# Patient Record
Sex: MALE | Race: WHITE | NOT HISPANIC OR LATINO
[De-identification: names, ages, dates, MRNs, and addresses within clinical notes are randomized per-mention and may not be internally consistent; named-entity substitution may affect disease eponyms.]

---

## 2017-01-27 ENCOUNTER — TRANSCRIPTION ENCOUNTER (OUTPATIENT)
Age: 57
End: 2017-01-27

## 2020-04-27 ENCOUNTER — OUTPATIENT (OUTPATIENT)
Dept: OUTPATIENT SERVICES | Facility: HOSPITAL | Age: 60
LOS: 1 days | Discharge: HOME | End: 2020-04-27

## 2020-04-30 ENCOUNTER — TRANSCRIPTION ENCOUNTER (OUTPATIENT)
Age: 60
End: 2020-04-30

## 2020-05-01 ENCOUNTER — TRANSCRIPTION ENCOUNTER (OUTPATIENT)
Age: 60
End: 2020-05-01

## 2020-05-28 DIAGNOSIS — F42.2 MIXED OBSESSIONAL THOUGHTS AND ACTS: ICD-10-CM

## 2021-11-11 ENCOUNTER — APPOINTMENT (OUTPATIENT)
Dept: OTOLARYNGOLOGY | Facility: CLINIC | Age: 61
End: 2021-11-11
Payer: COMMERCIAL

## 2021-11-11 VITALS
DIASTOLIC BLOOD PRESSURE: 83 MMHG | HEART RATE: 57 BPM | HEIGHT: 69 IN | SYSTOLIC BLOOD PRESSURE: 127 MMHG | BODY MASS INDEX: 26.66 KG/M2 | WEIGHT: 180 LBS

## 2021-11-11 DIAGNOSIS — Z86.2 PERSONAL HISTORY OF DISEASES OF THE BLOOD AND BLOOD-FORMING ORGANS AND CERTAIN DISORDERS INVOLVING THE IMMUNE MECHANISM: ICD-10-CM

## 2021-11-11 DIAGNOSIS — F42.9 OBSESSIVE-COMPULSIVE DISORDER, UNSPECIFIED: ICD-10-CM

## 2021-11-11 DIAGNOSIS — Z78.9 OTHER SPECIFIED HEALTH STATUS: ICD-10-CM

## 2021-11-11 DIAGNOSIS — Z86.19 PERSONAL HISTORY OF OTHER INFECTIOUS AND PARASITIC DISEASES: ICD-10-CM

## 2021-11-11 DIAGNOSIS — H93.8X3 OTHER SPECIFIED DISORDERS OF EAR, BILATERAL: ICD-10-CM

## 2021-11-11 DIAGNOSIS — E04.1 NONTOXIC SINGLE THYROID NODULE: ICD-10-CM

## 2021-11-11 PROCEDURE — 99203 OFFICE O/P NEW LOW 30 MIN: CPT

## 2021-11-11 PROCEDURE — 92550 TYMPANOMETRY & REFLEX THRESH: CPT

## 2021-11-11 PROCEDURE — 92557 COMPREHENSIVE HEARING TEST: CPT

## 2021-11-11 PROCEDURE — 92588 EVOKED AUDITORY TST COMPLETE: CPT

## 2021-11-11 RX ORDER — PREDNISONE 10 MG/1
10 TABLET ORAL 3 TIMES DAILY
Qty: 39 | Refills: 0 | Status: ACTIVE | COMMUNITY
Start: 2021-11-11 | End: 1900-01-01

## 2021-11-11 RX ORDER — NORTRIPTYLINE HYDROCHLORIDE 75 MG/1
CAPSULE ORAL
Refills: 0 | Status: ACTIVE | COMMUNITY

## 2021-11-11 RX ORDER — FLUOXETINE HCL 10 MG
TABLET ORAL
Refills: 0 | Status: ACTIVE | COMMUNITY
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
## 2021-11-11 NOTE — REASON FOR VISIT
[Initial Consultation] : an initial consultation for [FreeTextEntry2] : Patient here to check on ears

## 2021-11-11 NOTE — DATA REVIEWED
[de-identified] : AS: Type As tymp )ETF abnormal )AD: Type A tymp (ETF abnormal)\par OAEs: AD; present 1-3.4kHz; AS; present 1-2.4kHz\par AUDIO: AD: Mild SNHL rising to WNL precipitously sloping to a moderately severe SNHL 250-8000 Hz - AS: Hearing -2000 Hz precipitously sloping to a severe SNHL 5440-2590 Hz\par REC: 1) ENT F/U 2)otoneuro w/u

## 2021-11-11 NOTE — ASSESSMENT
[FreeTextEntry1] : reviewed and reconciled meds, allergies, Pmhx, Famhx, Sochx, Surghx\par \par H/o Guillan barre and OCD presents wit B/L Tinnitus and clogged ears\par Thyroid nodule on PE\par \par \par \par \par \par Plan\par Audio reviewed and interpreted by Dr. Novak bilateral profound snhl. right with low freq loss as well.\par Sono thyroid to be done at [facility]\par \par Steroids for 12 days calendar given

## 2021-11-11 NOTE — REVIEW OF SYSTEMS
[Hearing Loss] : hearing loss [Ear Noises] : ear noises [Lightheadedness] : lightheadedness [Eyes Itch] : itching of the eyes [Joint Pain] : joint pain [Negative] : Heme/Lymph [FreeTextEntry9] : muscle aches [FreeTextEntry1] : Feel cooler than others

## 2021-11-11 NOTE — PHYSICAL EXAM
[Hearing Valdez Test (Tuning Fork On Forehead)] : no lateralization of tone [] : septum deviated to the left [Normal] : mucosa is normal [Midline] : trachea located in midline position [de-identified] : thyroid nodule [FreeTextEntry1] : no sinus tenderness,sensation intact [de-identified] : pupils equal and reactive

## 2021-11-24 ENCOUNTER — APPOINTMENT (OUTPATIENT)
Dept: OTOLARYNGOLOGY | Facility: CLINIC | Age: 61
End: 2021-11-24
Payer: COMMERCIAL

## 2021-11-24 VITALS
BODY MASS INDEX: 26.36 KG/M2 | HEIGHT: 69 IN | SYSTOLIC BLOOD PRESSURE: 129 MMHG | HEART RATE: 69 BPM | DIASTOLIC BLOOD PRESSURE: 84 MMHG | WEIGHT: 178 LBS

## 2021-11-24 DIAGNOSIS — H93.13 TINNITUS, BILATERAL: ICD-10-CM

## 2021-11-24 DIAGNOSIS — H90.5 UNSPECIFIED SENSORINEURAL HEARING LOSS: ICD-10-CM

## 2021-11-24 DIAGNOSIS — G61.0 GUILLAIN-BARRE SYNDROME: ICD-10-CM

## 2021-11-24 DIAGNOSIS — H83.3X1 NOISE EFFECTS ON RIGHT INNER EAR: ICD-10-CM

## 2021-11-24 PROCEDURE — 92588 EVOKED AUDITORY TST COMPLETE: CPT

## 2021-11-24 PROCEDURE — 92557 COMPREHENSIVE HEARING TEST: CPT

## 2021-11-24 PROCEDURE — 99213 OFFICE O/P EST LOW 20 MIN: CPT

## 2021-11-24 PROCEDURE — 92550 TYMPANOMETRY & REFLEX THRESH: CPT

## 2021-11-24 NOTE — PHYSICAL EXAM
[Hearing Valdez Test (Tuning Fork On Forehead)] : no lateralization of tone [Normal] : no abnormal secretions [Hearing Loss Right Only] : normal [Hearing Loss Left Only] : normal [de-identified] : sensations intact
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
## 2021-11-24 NOTE — DATA REVIEWED
[de-identified] : AD: Type A\par AS: Type As\par AUDIO:AD: mild SNHL, 250-500 Hx, WNL, 750-3K Hz, mod-severe SNHL, 4-8KHz\par AS: WNL, 250-2KHz, mod-severe to severe SNHL, 3-8KHz\par REC: ENT f/u, re-eval per MD, further recs pending\par

## 2022-10-23 ENCOUNTER — NON-APPOINTMENT (OUTPATIENT)
Age: 62
End: 2022-10-23

## 2022-11-01 ENCOUNTER — APPOINTMENT (OUTPATIENT)
Dept: NUCLEAR MEDICINE | Facility: CLINIC | Age: 62
End: 2022-11-01

## 2024-02-04 ENCOUNTER — NON-APPOINTMENT (OUTPATIENT)
Age: 64
End: 2024-02-04

## 2024-10-01 ENCOUNTER — NON-APPOINTMENT (OUTPATIENT)
Age: 64
End: 2024-10-01

## 2024-10-02 ENCOUNTER — NON-APPOINTMENT (OUTPATIENT)
Age: 64
End: 2024-10-02

## 2024-10-02 ENCOUNTER — APPOINTMENT (OUTPATIENT)
Dept: OTOLARYNGOLOGY | Facility: CLINIC | Age: 64
End: 2024-10-02
Payer: MEDICARE

## 2024-10-02 VITALS
BODY MASS INDEX: 28.6 KG/M2 | WEIGHT: 193.13 LBS | HEART RATE: 70 BPM | HEIGHT: 69 IN | SYSTOLIC BLOOD PRESSURE: 100 MMHG | DIASTOLIC BLOOD PRESSURE: 69 MMHG

## 2024-10-02 DIAGNOSIS — K21.9 GASTRO-ESOPHAGEAL REFLUX DISEASE W/OUT ESOPHAGITIS: ICD-10-CM

## 2024-10-02 DIAGNOSIS — J34.2 DEVIATED NASAL SEPTUM: ICD-10-CM

## 2024-10-02 DIAGNOSIS — R07.0 PAIN IN THROAT: ICD-10-CM

## 2024-10-02 DIAGNOSIS — R22.1 LOCALIZED SWELLING, MASS AND LUMP, HEAD: ICD-10-CM

## 2024-10-02 DIAGNOSIS — J38.4 EDEMA OF LARYNX: ICD-10-CM

## 2024-10-02 DIAGNOSIS — G61.0 GUILLAIN-BARRE SYNDROME: ICD-10-CM

## 2024-10-02 DIAGNOSIS — R22.0 LOCALIZED SWELLING, MASS AND LUMP, HEAD: ICD-10-CM

## 2024-10-02 DIAGNOSIS — Z82.49 FAMILY HISTORY OF ISCHEMIC HEART DISEASE AND OTHER DISEASES OF THE CIRCULATORY SYSTEM: ICD-10-CM

## 2024-10-02 DIAGNOSIS — H93.13 TINNITUS, BILATERAL: ICD-10-CM

## 2024-10-02 DIAGNOSIS — H90.3 SENSORINEURAL HEARING LOSS, BILATERAL: ICD-10-CM

## 2024-10-02 PROCEDURE — 31575 DIAGNOSTIC LARYNGOSCOPY: CPT

## 2024-10-02 PROCEDURE — 99203 OFFICE O/P NEW LOW 30 MIN: CPT | Mod: 25

## 2024-10-02 PROCEDURE — 92557 COMPREHENSIVE HEARING TEST: CPT

## 2024-10-02 PROCEDURE — 92570 ACOUSTIC IMMITANCE TESTING: CPT

## 2024-10-02 NOTE — HISTORY OF PRESENT ILLNESS
[de-identified] : Patient with h/o asymmetrical bilateral HF HL presents today stating that he gets a sore throat on the right side of his throat every couple of months.  He states that the last 2 times this sore throat happened it last about a month. He also states that he has bilateral tinnitus, which started about 12 years ago when he was diagnosed with Guillain-[place] Syndrome. He denies improvement from Arches Tinnitus Formula and Lipoflavonoids. He denies smoking. He denies drinking alcohol. He states that his sore throat pain can extend up into his ear.

## 2024-10-02 NOTE — ASSESSMENT
[FreeTextEntry1] : Reviewed and reconciled medications, allergies, PMHx, PSHx, SocHx, FMHx.  Patient with h/o asymmetrical bilateral HF HL presents today stating that he gets a sore throat on the right side of his throat every couple of months.  He states that the last 2 times this sore throat happened it last about a month. He also states that he has bilateral tinnitus, which started about 12 years ago when he was diagnosed with Guillain-[place] Syndrome. He denies improvement from Arches Tinnitus Formula and Lipoflavonoids. He denies smoking. He denies drinking alcohol. He states that his sore throat pain can extend up into his ear.   Thyroid 11/2021: -normal -no evidence of cervical lymph nodes   Physical exam: -ears are clean and clear bilaterally -tuning forks lateralizes to the left ear -deviated septum left along the floor -mildly inflamed turbinates bilaterally -tonsils removed -fullness left neck at the level of the carotid -pupils are equal and reactive  ENT Procedure: Flexible laryngoscopy -deviated septum left -mildly inflamed nasopharynx L>R -BOT appears normal -bilateral lingual tonsil hyperplasia, but symmetric -edema of the postcricoid, arytenoids and interarytenoids.  Audio 10/2/24: -Type A Tymps AU -ETF WNL AD -ETF Abnormal AS -AD: Hearing WNL sloping to a mod-severe SNHL 250-8000 Hz -AS: Hearing WNL sloping to severe SNHL 250-8000 Hz -88% discrimination at 60 dB AD -92% discrimination at 65 dB AS -right TPP: +14 -left TPP: 0  Plan:  Audio - results interpreted by Dr. Novak and reviewed with the patient. -Consider amplification -Start Reflux diet- no eating 2 hours before bed -Ordered CT Neck Soft Tissue -FU with results from CT scan

## 2024-10-02 NOTE — PHYSICAL EXAM
[Valdez Test Lateralizes To Left] : tone lateralization to the left [] : septum deviated to the left [Midline] : trachea located in midline position [Removed] : palatine tonsils previously removed [Normal] : no rashes [de-identified] : fullness left neck at the level of the carotid [Hearing Loss Right Only] : normal [Hearing Loss Left Only] : normal [de-identified] :  mildly inflamed turbinates bilaterally [FreeTextEntry2] :  sinuses nontender to percussion.  sensations intact [de-identified] : pupils are equal and reactive

## 2024-10-02 NOTE — HISTORY OF PRESENT ILLNESS
[de-identified] : Patient with h/o asymmetrical bilateral HF HL presents today stating that he gets a sore throat on the right side of his throat every couple of months.  He states that the last 2 times this sore throat happened it last about a month. He also states that he has bilateral tinnitus, which started about 12 years ago when he was diagnosed with Guillain-[place] Syndrome. He denies improvement from Arches Tinnitus Formula and Lipoflavonoids. He denies smoking. He denies drinking alcohol. He states that his sore throat pain can extend up into his ear.

## 2024-10-02 NOTE — PROCEDURE
[Complicated Symptoms] : complicated symptoms requiring more thorough examination than provided by mirror [de-identified] :  Procedure: Flexible Fiberoptic Laryngoscopy: Risks, benefits, and alternatives of flexible endoscopy were explained to the patient. The patient gave oral consent to proceed. The flexible scope was inserted into the right nasal cavity and advanced towards the nasopharynx. Visualized mucosa over the turbinates and septum were as described above. There was a deviated septum left. The nasopharynx was mildly inflamed L>R. Oropharyngeal walls were symmetric and mobile without lesion, mass, or edema. Hypopharynx was also without lesion or edema. Larynx was mobile without lesions. Supraglottic structures were free of mass and asymmetry, but edema of the postcricoid, arytenoids and interarytenoids. True vocal folds were white without mass or lesion. Base of tongue was within normal limits. There was bilateral lingual tonsil hyperplasia, but symmetric. -deviated septum left -mildly inflamed nasopharynx L>R -BOT appears normal -bilateral lingual tonsil hyperplasia, but symmetric -edema of the postcricoid, arytenoids and interarytenoids.

## 2024-10-02 NOTE — ADDENDUM
[FreeTextEntry1] :  Documented by Myron Sung acting as scribe for Dr. Novak on 10/02/2024. All Medical record entries made by the Scribe were at my, Dr. Novak, direction and personally dictated by me on 10/02/2024 . I have reviewed the chart and agree that the record accurately reflects my personal performance of the history, physical exam, assessment and plan. I have also personally directed, reviewed, and agreed with the discharge instructions.

## 2024-10-02 NOTE — ASSESSMENT
[FreeTextEntry1] : Reviewed and reconciled medications, allergies, PMHx, PSHx, SocHx, FMHx.  Patient with h/o asymmetrical bilateral HF HL presents today stating that he gets a sore throat on the right side of his throat every couple of months.  He states that the last 2 times this sore throat happened it last about a month. He also states that he has bilateral tinnitus, which started about 12 years ago when he was diagnosed with Guillain-[place] Syndrome. He denies improvement from Arches Tinnitus Formula and Lipoflavonoids. He denies smoking. He denies drinking alcohol. He states that his sore throat pain can extend up into his ear.   Thyroid 11/2021: -normal -no evidence of cervical lymph nodes   Physical exam: -ears are clean and clear bilaterally -tuning forks lateralizes to the left ear -deviated septum left along the floor -mildly inflamed turbinates bilaterally -tonsils removed -fullness left neck at the level of the carotid -pupils are equal and reactive  ENT Procedure: Flexible laryngoscopy -deviated septum left -mildly inflamed nasopharynx L>R -BOT appears normal -bilateral lingual tonsil hyperplasia, but symmetric -edema of the postcricoid, arytenoids and interarytenoids.  Audio 10/2/24: -Type A Tymps AU -ETF WNL AD -ETF Abnormal AS -AD: Hearing WNL sloping to a mod-severe SNHL 250-8000 Hz -AS: Hearing WNL sloping to severe SNHL 250-8000 Hz -88% discrimination at 60 dB AD -92% discrimination at 65 dB AS -right TPP: +14 -left TPP: 0  Plan:  Audio - results interpreted by Dr. Novak and reviewed with the patient. -Consider amplification -Start Reflux diet- no eating 2 hours before bed -Ordered CT Neck Soft Tissue -FU with results from CT scan

## 2024-10-02 NOTE — CONSULT LETTER
[Dear  ___] : Dear  [unfilled], [Consult Letter:] : I had the pleasure of evaluating your patient, [unfilled]. [Please see my note below.] : Please see my note below. [Consult Closing:] : Thank you very much for allowing me to participate in the care of this patient.  If you have any questions, please do not hesitate to contact me. [Sincerely,] : Sincerely, [FreeTextEntry3] :  Max Novak MD FACS

## 2024-10-02 NOTE — PROCEDURE
[Complicated Symptoms] : complicated symptoms requiring more thorough examination than provided by mirror [de-identified] :  Procedure: Flexible Fiberoptic Laryngoscopy: Risks, benefits, and alternatives of flexible endoscopy were explained to the patient. The patient gave oral consent to proceed. The flexible scope was inserted into the right nasal cavity and advanced towards the nasopharynx. Visualized mucosa over the turbinates and septum were as described above. There was a deviated septum left. The nasopharynx was mildly inflamed L>R. Oropharyngeal walls were symmetric and mobile without lesion, mass, or edema. Hypopharynx was also without lesion or edema. Larynx was mobile without lesions. Supraglottic structures were free of mass and asymmetry, but edema of the postcricoid, arytenoids and interarytenoids. True vocal folds were white without mass or lesion. Base of tongue was within normal limits. There was bilateral lingual tonsil hyperplasia, but symmetric. -deviated septum left -mildly inflamed nasopharynx L>R -BOT appears normal -bilateral lingual tonsil hyperplasia, but symmetric -edema of the postcricoid, arytenoids and interarytenoids.

## 2024-10-02 NOTE — REASON FOR VISIT
[Initial Consultation] : an initial consultation for [FreeTextEntry2] : hissing in ear and burning in throat

## 2024-10-02 NOTE — DATA REVIEWED
[de-identified] : Audio 10/2/24: -Type A Tymps AU -ETF WNL AD -ETF Abnormal AS -AD: Hearing WNL sloping to a mod-severe SNHL 250-8000 Hz -AS: Hearing WNL sloping to severe SNHL 250-8000 Hz -88% discrimination at 60 dB AD -92% discrimination at 65 dB AS -right TPP: +14 -left TPP: 0 [de-identified] : US Thyroid 11/2021: -normal -no evidence of cervical lymph nodes

## 2024-10-02 NOTE — PHYSICAL EXAM
[Valdez Test Lateralizes To Left] : tone lateralization to the left [] : septum deviated to the left [Midline] : trachea located in midline position [Removed] : palatine tonsils previously removed [Normal] : no rashes [de-identified] : fullness left neck at the level of the carotid [Hearing Loss Right Only] : normal [Hearing Loss Left Only] : normal [de-identified] :  mildly inflamed turbinates bilaterally [FreeTextEntry2] :  sinuses nontender to percussion.  sensations intact [de-identified] : pupils are equal and reactive

## 2024-10-02 NOTE — DATA REVIEWED
[de-identified] : Audio 10/2/24: -Type A Tymps AU -ETF WNL AD -ETF Abnormal AS -AD: Hearing WNL sloping to a mod-severe SNHL 250-8000 Hz -AS: Hearing WNL sloping to severe SNHL 250-8000 Hz -88% discrimination at 60 dB AD -92% discrimination at 65 dB AS -right TPP: +14 -left TPP: 0 [de-identified] : US Thyroid 11/2021: -normal -no evidence of cervical lymph nodes

## 2025-07-28 ENCOUNTER — NON-APPOINTMENT (OUTPATIENT)
Age: 65
End: 2025-07-28

## 2025-07-28 ENCOUNTER — EMERGENCY (EMERGENCY)
Facility: HOSPITAL | Age: 65
LOS: 1 days | End: 2025-07-28
Attending: EMERGENCY MEDICINE
Payer: MEDICARE

## 2025-07-28 VITALS
HEART RATE: 59 BPM | DIASTOLIC BLOOD PRESSURE: 64 MMHG | TEMPERATURE: 98 F | RESPIRATION RATE: 18 BRPM | OXYGEN SATURATION: 98 % | SYSTOLIC BLOOD PRESSURE: 145 MMHG | WEIGHT: 199.08 LBS

## 2025-07-28 LAB
ALBUMIN SERPL ELPH-MCNC: 4.2 G/DL — SIGNIFICANT CHANGE UP (ref 3.3–5.2)
ALP SERPL-CCNC: 82 U/L — SIGNIFICANT CHANGE UP (ref 40–120)
ALT FLD-CCNC: 17 U/L — SIGNIFICANT CHANGE UP
ANION GAP SERPL CALC-SCNC: 12 MMOL/L — SIGNIFICANT CHANGE UP (ref 5–17)
APTT BLD: 29.6 SEC — SIGNIFICANT CHANGE UP (ref 26.1–36.8)
AST SERPL-CCNC: 19 U/L — SIGNIFICANT CHANGE UP
BASOPHILS # BLD AUTO: 0.03 K/UL — SIGNIFICANT CHANGE UP (ref 0–0.2)
BASOPHILS NFR BLD AUTO: 0.5 % — SIGNIFICANT CHANGE UP (ref 0–2)
BILIRUB SERPL-MCNC: 0.6 MG/DL — SIGNIFICANT CHANGE UP (ref 0.4–2)
BUN SERPL-MCNC: 17.1 MG/DL — SIGNIFICANT CHANGE UP (ref 8–20)
CALCIUM SERPL-MCNC: 9.4 MG/DL — SIGNIFICANT CHANGE UP (ref 8.4–10.5)
CHLORIDE SERPL-SCNC: 101 MMOL/L — SIGNIFICANT CHANGE UP (ref 96–108)
CO2 SERPL-SCNC: 25 MMOL/L — SIGNIFICANT CHANGE UP (ref 22–29)
CREAT SERPL-MCNC: 0.86 MG/DL — SIGNIFICANT CHANGE UP (ref 0.5–1.3)
EGFR: 96 ML/MIN/1.73M2 — SIGNIFICANT CHANGE UP
EGFR: 96 ML/MIN/1.73M2 — SIGNIFICANT CHANGE UP
EOSINOPHIL # BLD AUTO: 0.1 K/UL — SIGNIFICANT CHANGE UP (ref 0–0.5)
EOSINOPHIL NFR BLD AUTO: 1.8 % — SIGNIFICANT CHANGE UP (ref 0–6)
GLUCOSE SERPL-MCNC: 135 MG/DL — HIGH (ref 70–99)
HCT VFR BLD CALC: 40.2 % — SIGNIFICANT CHANGE UP (ref 39–50)
HGB BLD-MCNC: 13.5 G/DL — SIGNIFICANT CHANGE UP (ref 13–17)
IMM GRANULOCYTES # BLD AUTO: 0.01 K/UL — SIGNIFICANT CHANGE UP (ref 0–0.07)
IMM GRANULOCYTES NFR BLD AUTO: 0.2 % — SIGNIFICANT CHANGE UP (ref 0–0.9)
INR BLD: 1.08 RATIO — SIGNIFICANT CHANGE UP (ref 0.85–1.16)
LYMPHOCYTES # BLD AUTO: 1.66 K/UL — SIGNIFICANT CHANGE UP (ref 1–3.3)
LYMPHOCYTES NFR BLD AUTO: 30.2 % — SIGNIFICANT CHANGE UP (ref 13–44)
MCHC RBC-ENTMCNC: 29.2 PG — SIGNIFICANT CHANGE UP (ref 27–34)
MCHC RBC-ENTMCNC: 33.6 G/DL — SIGNIFICANT CHANGE UP (ref 32–36)
MCV RBC AUTO: 87 FL — SIGNIFICANT CHANGE UP (ref 80–100)
MONOCYTES # BLD AUTO: 0.41 K/UL — SIGNIFICANT CHANGE UP (ref 0–0.9)
MONOCYTES NFR BLD AUTO: 7.5 % — SIGNIFICANT CHANGE UP (ref 2–14)
NEUTROPHILS # BLD AUTO: 3.28 K/UL — SIGNIFICANT CHANGE UP (ref 1.8–7.4)
NEUTROPHILS NFR BLD AUTO: 59.8 % — SIGNIFICANT CHANGE UP (ref 43–77)
NRBC # BLD AUTO: 0 K/UL — SIGNIFICANT CHANGE UP (ref 0–0)
NRBC # FLD: 0 K/UL — SIGNIFICANT CHANGE UP (ref 0–0)
NRBC BLD AUTO-RTO: 0 /100 WBCS — SIGNIFICANT CHANGE UP (ref 0–0)
PLATELET # BLD AUTO: 274 K/UL — SIGNIFICANT CHANGE UP (ref 150–400)
PMV BLD: 9 FL — SIGNIFICANT CHANGE UP (ref 7–13)
POTASSIUM SERPL-MCNC: 4 MMOL/L — SIGNIFICANT CHANGE UP (ref 3.5–5.3)
POTASSIUM SERPL-SCNC: 4 MMOL/L — SIGNIFICANT CHANGE UP (ref 3.5–5.3)
PROT SERPL-MCNC: 6.7 G/DL — SIGNIFICANT CHANGE UP (ref 6.6–8.7)
PROTHROM AB SERPL-ACNC: 12.2 SEC — SIGNIFICANT CHANGE UP (ref 9.9–13.4)
RBC # BLD: 4.62 M/UL — SIGNIFICANT CHANGE UP (ref 4.2–5.8)
RBC # FLD: 12.4 % — SIGNIFICANT CHANGE UP (ref 10.3–14.5)
SODIUM SERPL-SCNC: 138 MMOL/L — SIGNIFICANT CHANGE UP (ref 135–145)
WBC # BLD: 5.49 K/UL — SIGNIFICANT CHANGE UP (ref 3.8–10.5)
WBC # FLD AUTO: 5.49 K/UL — SIGNIFICANT CHANGE UP (ref 3.8–10.5)

## 2025-07-28 PROCEDURE — 85025 COMPLETE CBC W/AUTO DIFF WBC: CPT

## 2025-07-28 PROCEDURE — 0042T: CPT

## 2025-07-28 PROCEDURE — 85730 THROMBOPLASTIN TIME PARTIAL: CPT

## 2025-07-28 PROCEDURE — 70450 CT HEAD/BRAIN W/O DYE: CPT

## 2025-07-28 PROCEDURE — 70498 CT ANGIOGRAPHY NECK: CPT | Mod: 26

## 2025-07-28 PROCEDURE — 96374 THER/PROPH/DIAG INJ IV PUSH: CPT | Mod: XU

## 2025-07-28 PROCEDURE — 93010 ELECTROCARDIOGRAM REPORT: CPT

## 2025-07-28 PROCEDURE — 82962 GLUCOSE BLOOD TEST: CPT

## 2025-07-28 PROCEDURE — 93005 ELECTROCARDIOGRAM TRACING: CPT

## 2025-07-28 PROCEDURE — 70498 CT ANGIOGRAPHY NECK: CPT

## 2025-07-28 PROCEDURE — 85610 PROTHROMBIN TIME: CPT

## 2025-07-28 PROCEDURE — 70496 CT ANGIOGRAPHY HEAD: CPT

## 2025-07-28 PROCEDURE — 36415 COLL VENOUS BLD VENIPUNCTURE: CPT

## 2025-07-28 PROCEDURE — 70450 CT HEAD/BRAIN W/O DYE: CPT | Mod: 26,XU

## 2025-07-28 PROCEDURE — 80053 COMPREHEN METABOLIC PANEL: CPT

## 2025-07-28 PROCEDURE — 99285 EMERGENCY DEPT VISIT HI MDM: CPT | Mod: 25

## 2025-07-28 PROCEDURE — 99285 EMERGENCY DEPT VISIT HI MDM: CPT

## 2025-07-28 PROCEDURE — 70496 CT ANGIOGRAPHY HEAD: CPT | Mod: 26

## 2025-07-28 RX ORDER — KETOROLAC TROMETHAMINE 30 MG/ML
15 INJECTION, SOLUTION INTRAMUSCULAR; INTRAVENOUS ONCE
Refills: 0 | Status: DISCONTINUED | OUTPATIENT
Start: 2025-07-28 | End: 2025-07-28

## 2025-07-28 RX ORDER — SODIUM CHLORIDE 9 G/1000ML
1000 INJECTION, SOLUTION INTRAVENOUS ONCE
Refills: 0 | Status: COMPLETED | OUTPATIENT
Start: 2025-07-28 | End: 2025-07-28

## 2025-07-28 RX ADMIN — KETOROLAC TROMETHAMINE 15 MILLIGRAM(S): 30 INJECTION, SOLUTION INTRAMUSCULAR; INTRAVENOUS at 19:14

## 2025-07-28 RX ADMIN — SODIUM CHLORIDE 1000 MILLILITER(S): 9 INJECTION, SOLUTION INTRAVENOUS at 19:12
